# Patient Record
Sex: FEMALE | Race: WHITE | Employment: FULL TIME | URBAN - METROPOLITAN AREA
[De-identification: names, ages, dates, MRNs, and addresses within clinical notes are randomized per-mention and may not be internally consistent; named-entity substitution may affect disease eponyms.]

---

## 2024-07-18 ENCOUNTER — OFFICE VISIT (OUTPATIENT)
Age: 59
End: 2024-07-18
Payer: OTHER GOVERNMENT

## 2024-07-18 VITALS
WEIGHT: 210 LBS | TEMPERATURE: 97 F | BODY MASS INDEX: 35.85 KG/M2 | HEIGHT: 64 IN | HEART RATE: 66 BPM | DIASTOLIC BLOOD PRESSURE: 69 MMHG | OXYGEN SATURATION: 97 % | SYSTOLIC BLOOD PRESSURE: 132 MMHG

## 2024-07-18 DIAGNOSIS — M79.89 SOFT TISSUE MASS: Primary | ICD-10-CM

## 2024-07-18 DIAGNOSIS — L08.9 INFECTED SEBACEOUS CYST: ICD-10-CM

## 2024-07-18 DIAGNOSIS — L72.3 INFECTED SEBACEOUS CYST: ICD-10-CM

## 2024-07-18 DIAGNOSIS — R22.2 MASS ON BACK: ICD-10-CM

## 2024-07-18 PROCEDURE — 99204 OFFICE O/P NEW MOD 45 MIN: CPT | Performed by: SURGERY

## 2024-07-18 RX ORDER — METFORMIN HYDROCHLORIDE 500 MG/1
500 TABLET, EXTENDED RELEASE ORAL
COMMUNITY

## 2024-07-18 RX ORDER — THYROID 15 MG/1
15 TABLET ORAL
COMMUNITY

## 2024-07-18 RX ORDER — SULFAMETHOXAZOLE AND TRIMETHOPRIM 800; 160 MG/1; MG/1
1 TABLET ORAL 2 TIMES DAILY
Qty: 20 TABLET | Refills: 0 | Status: SHIPPED | OUTPATIENT
Start: 2024-07-18 | End: 2024-07-28

## 2024-07-18 RX ORDER — ROSUVASTATIN CALCIUM 10 MG/1
10 TABLET, COATED ORAL NIGHTLY
COMMUNITY
Start: 2024-07-09

## 2024-07-18 RX ORDER — LOSARTAN POTASSIUM 25 MG/1
25 TABLET ORAL DAILY
COMMUNITY

## 2024-07-18 NOTE — PROGRESS NOTES
Angie Ornelas is a 59 y.o. female (: 1965) presenting to address:    Chief Complaint   Patient presents with    New Patient     Cyst of back        Medication list and allergies have been reviewed with Angie Ornelas and updated as of today's date.     I have gone over all Medical, Surgical and Social History with Angie Ornelas and updated/added the information accordingly.    
distress, alert, oriented times 3, afebrile, and normal vitals   Eyes:  conjunctivae and sclerae normal, pupils equal, round, reactive to light   Throat & Neck: normal, no erythema or exudates noted. , and no palpable masses   Lungs:   clear to auscultation bilaterally   Heart:  Regular rate and rhythm   Abdomen:   rounded, soft, nontender, nondistended, no masses or organomegaly,   Extremities: extremities normal, atraumatic, no cyanosis or edema   Back: There is what seems to be a sebaceous cyst with slight induration and some mild erythema but no clear fluctuation.  It is nontender and there is no clear drainage.       Imaging and Lab Review:     CBC: No results found for: \"WBC\", \"RBC\", \"HGB\", \"HCT\", \"PLT\"  BMP: No results found for: \"GLU\", \"NA\", \"K\", \"CL\", \"CO2\", \"BUN\"  CMP:No results found for: \"GLU\", \"NA\", \"K\", \"CL\", \"CO2\", \"BUN\", \"TP\", \"GLOB\"    No results found for this or any previous visit (from the past 24 hour(s)).    images and reports reviewed    Assessment:   Angie Ornelas is a 59 y.o. female who was referred to me for evaluation of a soft tissue mass on the back that has recently been infected.  The patient has a history of hypertension and diabetes and obesity and she stated that she had this cyst for many years possibly decades.  It recently got infected and she was prescribed doxycycline and it did improve.  Just finished a course of the antibiotic yesterday.  Denies any fever or chills and she stated that currently there is no more drainage.    I explained to the patient that currently there is no need for incision and drainage because there is no clear abscess and it seems the course of antibiotics helped but not completely so I would like her to take another course of Bactrim for 10 days and apply warm compresses.  I also explained to her that at some point she may need surgical excision but not now because there is still some inflammatory changes and erythema and the excision should be

## 2024-08-05 ENCOUNTER — OFFICE VISIT (OUTPATIENT)
Age: 59
End: 2024-08-05
Payer: OTHER GOVERNMENT

## 2024-08-05 VITALS
BODY MASS INDEX: 36.02 KG/M2 | SYSTOLIC BLOOD PRESSURE: 122 MMHG | HEART RATE: 70 BPM | WEIGHT: 211 LBS | TEMPERATURE: 97.5 F | HEIGHT: 64 IN | OXYGEN SATURATION: 99 % | DIASTOLIC BLOOD PRESSURE: 68 MMHG

## 2024-08-05 DIAGNOSIS — L08.9 INFECTED SEBACEOUS CYST: ICD-10-CM

## 2024-08-05 DIAGNOSIS — R22.2 MASS ON BACK: ICD-10-CM

## 2024-08-05 DIAGNOSIS — M79.89 SOFT TISSUE MASS: Primary | ICD-10-CM

## 2024-08-05 DIAGNOSIS — L72.3 INFECTED SEBACEOUS CYST: ICD-10-CM

## 2024-08-05 PROCEDURE — 99213 OFFICE O/P EST LOW 20 MIN: CPT | Performed by: SURGERY

## 2024-08-05 NOTE — PROGRESS NOTES
General Surgery Consult    Angie Ornelas  Admit date: (Not on file)    MRN: [id number]     : 1965     Age: 59 y.o.        Attending Physician: Irasema Dorsey MD, [facility]      History of Present Illness:      Angie Ornelas is a 59 y.o. female who I have seen last month for evaluation of a soft tissue mass on the back that was infected consistent with a sebaceous cyst.  Patient has a history of obesity and hypertension and diabetes and she presented with a cyst that has been there for years.  It was infected and she finished a course of antibiotics and at that time I prescribed another course of antibiotics and she is here for follow-up.  Stated her  has been changing the dressing and she is doing much better.  She said she does not have any pain and no drainage anymore and she has no fever or chills.    There are no problems to display for this patient.    Past Medical History:   Diagnosis Date    Hypertension     Type 2 diabetes mellitus without complication (HCC)       No past surgical history on file.   Social History     Tobacco Use    Smoking status: Never    Smokeless tobacco: Never   Substance Use Topics    Alcohol use: Not Currently      Social History     Tobacco Use   Smoking Status Never   Smokeless Tobacco Never     No family history on file.   Current Outpatient Medications   Medication Sig    metFORMIN (GLUCOPHAGE-XR) 500 MG extended release tablet 1 tablet    thyroid (ARMOUR THYROID) 15 MG tablet 1 tablet    rosuvastatin (CRESTOR) 10 MG tablet Take 1 tablet by mouth nightly    losartan (COZAAR) 25 MG tablet Take 1 tablet by mouth daily     No current facility-administered medications for this visit.      Allergies   Allergen Reactions    Molds & Smuts Other (See Comments) and Headaches    Penicillins     Sulfabenzamide Hives          Review of Systems:  Pertinent items are noted in the History of Present Illness.    Objective:     /68 (Site: Left Lower Arm, Position: